# Patient Record
Sex: FEMALE | Race: WHITE | NOT HISPANIC OR LATINO | Employment: FULL TIME | URBAN - METROPOLITAN AREA
[De-identification: names, ages, dates, MRNs, and addresses within clinical notes are randomized per-mention and may not be internally consistent; named-entity substitution may affect disease eponyms.]

---

## 2024-04-07 ENCOUNTER — HOSPITAL ENCOUNTER (EMERGENCY)
Facility: CLINIC | Age: 74
Discharge: HOME | End: 2024-04-07
Attending: EMERGENCY MEDICINE | Admitting: EMERGENCY MEDICINE
Payer: OTHER MISCELLANEOUS

## 2024-04-07 VITALS
BODY MASS INDEX: 30.39 KG/M2 | SYSTOLIC BLOOD PRESSURE: 157 MMHG | HEART RATE: 81 BPM | RESPIRATION RATE: 16 BRPM | HEIGHT: 62 IN | TEMPERATURE: 98.5 F | WEIGHT: 165.12 LBS | DIASTOLIC BLOOD PRESSURE: 80 MMHG | OXYGEN SATURATION: 98 %

## 2024-04-07 DIAGNOSIS — S52.509A DISTAL RADIUS FRACTURE: Primary | ICD-10-CM

## 2024-04-07 DIAGNOSIS — S52.613A FRACTURE OF ULNAR STYLOID: ICD-10-CM

## 2024-04-07 PROCEDURE — 99282 EMERGENCY DEPT VISIT SF MDM: CPT

## 2024-04-07 ASSESSMENT — COLUMBIA-SUICIDE SEVERITY RATING SCALE - C-SSRS
1. IN THE PAST MONTH, HAVE YOU WISHED YOU WERE DEAD OR WISHED YOU COULD GO TO SLEEP AND NOT WAKE UP?: NO
6. HAVE YOU EVER DONE ANYTHING, STARTED TO DO ANYTHING, OR PREPARED TO DO ANYTHING TO END YOUR LIFE?: NO
2. HAVE YOU ACTUALLY HAD ANY THOUGHTS OF KILLING YOURSELF IN THE PAST MONTH?: NO

## 2024-04-07 ASSESSMENT — ACTIVITIES OF DAILY LIVING (ADL): ADLS_ACUITY_SCORE: 33

## 2024-04-07 NOTE — ED PROVIDER NOTES
"History   Chief Complaint:  Fall     HPI   Erin Parkinson is a 73 year old female presenting today for evaluation of right wrist pain.  Patient reports she was walking to work this morning and slipped, fell, landing on her right wrist.  She had immediate onset of pain.  She was seen at urgent care and had an x-ray performed which confirmed a right wrist fracture.  Due to continued pain, patient comes to the ER.  She denies any numbness or tingling in the right wrist.  She did not hit her head nor did she lose consciousness.    Independent Historian:   None - Patient Only    Review of External Notes:   I reviewed the note from urgent care from today.  I also reviewed the x-ray of the right wrist.    Radiologic interpretation of right wrist XR: Comminuted intra-articular mildly impacted and displaced distal right radius fracture. Concomitant mildly displaced ulnar styloid fracture. Slight ulnar positive variance with wrist soft tissue swelling. Degenerative change in the right wrist is most pronounced at the thumb CMC and STT joints. Diffuse bone demineralization.     Medications:    No current outpatient medications on file.    Past Medical History:    No past medical history on file.    Past Surgical History:    No past surgical history on file.     Physical Exam   Patient Vitals for the past 24 hrs:   BP Temp Temp src Pulse Resp SpO2 Height Weight   04/07/24 1425 (!) 150/85 98.5  F (36.9  C) Temporal 79 16 99 % 1.575 m (5' 2\") 74.9 kg (165 lb 2 oz)        Physical Exam  BP (!) 157/80   Pulse 81   Temp 98.5  F (36.9  C) (Temporal)   Resp 16   Ht 1.575 m (5' 2\")   Wt 74.9 kg (165 lb 2 oz)   SpO2 98%   BMI 30.20 kg/m     General: Appears younger than stated age.  Examined in room FT 01.  Accompanied by daughter.  Head: Atraumatic. Normocephalic.  CV: Regular rate and rhythm.  Capillary refill less than 3 seconds distal to the fracture.  Respiratory: Breathing comfortably on room air.  Msk: Right forearm in " splint.  Able to wiggle fingers.  Significant pain with palpation of the fingers.  Skin: Warm and dry. No rashes.  Neuro: Awake, alert, and conversant. No focal neurologic deficits.  Sensation intact to light touch of the fingertips.     Emergency Department Course   Emergency Department Course & Assessments:  Interventions:  Medications - No data to display     Assessments and Consultations:  Patient was seen in conjunction with attending physician Dr. Person.    1500   I performed my initial evaluation of the patient    Independent Interpretation (X-rays, CTs, rhythm strip):  None    Social Determinants of Health affecting care:   None    Disposition:  The patient was discharged to home.     Impression & Plan    Medical Decision Making:  This is a very pleasant 73-year-old female presenting today with a known right distal radius fracture and right ulnar styloid fracture.  Patient presents here from urgent care with continued pain.  After reviewing the images from urgent care, I do not feel that a reduction would provide the patient any added benefit given the comminuted and impacted nature of the distal radius fracture.  We discussed pain management at home.  Patient offered narcotic pain medication and declined.  Discussed scheduled ibuprofen for anti-inflammatory effect as well as ice, rest, elevation.  She is already in a splint and appears to be neurovascularly intact.  No evidence of compartment syndrome on exam. There is no indication for emergent reduction or emergent surgery at this time.  I placed a call to the [facility] referral line instructed patient to call them tomorrow afternoon if she does not hear back.  All questions were answered and she was discharged home in stable condition.    Diagnosis:    ICD-10-CM    1. Distal radius fracture  S52.509A     Right, comminuted, impacted      2. Fracture of ulnar styloid  S52.613A          Rose Martel PA-C  April 7, 2024   [facility]  [facility]           Rose Martel PA-C  04/07/24 4193

## 2024-04-07 NOTE — DISCHARGE INSTRUCTIONS
Alternate Tylenol and ibuprofen  Ibuprofen 600 mg every 6 hours  Tylenol 1000 mg every 8 hours  You should hear from Orthopedics in the next several days, if you do not hear from them by tomorrow afternoon, give them a call.    Rest, Ice, Compression, Elevation  Return for numbness or tingling in fingers

## 2024-04-07 NOTE — ED TRIAGE NOTES
Pt was over at urgent care and was sent to ED. Pt fell at work this morning and went to [facility] where xrays were taken showing a displaced fracture, per pt statement.  Pt states that the  told her she would probably need surgery and possibly pins to fix the fracture.  In tirage unable to see xrays.     Triage Assessment (Adult)       Row Name 04/07/24 6586          Triage Assessment    Airway WDL WDL        Respiratory WDL    Respiratory WDL WDL        Cardiac WDL    Cardiac WDL WDL

## 2024-04-07 NOTE — ED NOTES
Emergency Department Attending Supervision Note        I evaluated this patient with Martel PAC.       Briefly, the patient presented with right wrist injury after mechanical fall.  Review of outpatient records shows she has an impacted, intra-articular, and comminuted right distal radius fracture with no significant displacement or angulation.  Exam shows normal radian, ulnar, and median nerve function and good perfusion.  No indication for acute reduction at this time.  No evidence of complication regarding her splint.  Pain is well-controlled.  Plan outpatient follow-up with orthopedics and return precautions for worse pain, swelling, numbness, or any other concerns.      Independent interpretation: Outside right wrist x-ray shows comminuted, impacted, intra-articular distal radius fracture on the right with ulnar styloid fracture.  No significant displacement or angulation    Review of external records: Reviewed urgent care visit from today    Visit Diagnosis, Associated Orders, and Comments     ICD-10-CM    1. Distal radius fracture  S52.509A     Right, comminuted, impacted      2. Fracture of ulnar styloid  S52.613A         Avery Person MD  [facility]  [facility]           Avery Person MD  04/07/24 4370